# Patient Record
Sex: FEMALE | Race: WHITE
[De-identification: names, ages, dates, MRNs, and addresses within clinical notes are randomized per-mention and may not be internally consistent; named-entity substitution may affect disease eponyms.]

---

## 2017-02-13 ENCOUNTER — HOSPITAL ENCOUNTER (OUTPATIENT)
Dept: HOSPITAL 10 - GIL | Age: 62
Discharge: HOME | End: 2017-02-13
Attending: INTERNAL MEDICINE
Payer: COMMERCIAL

## 2017-02-13 VITALS — DIASTOLIC BLOOD PRESSURE: 89 MMHG | RESPIRATION RATE: 18 BRPM | HEART RATE: 76 BPM | SYSTOLIC BLOOD PRESSURE: 116 MMHG

## 2017-02-13 VITALS
HEIGHT: 61 IN | BODY MASS INDEX: 26.76 KG/M2 | BODY MASS INDEX: 26.76 KG/M2 | WEIGHT: 141.76 LBS | WEIGHT: 141.76 LBS | HEIGHT: 61 IN

## 2017-02-13 VITALS — SYSTOLIC BLOOD PRESSURE: 126 MMHG | RESPIRATION RATE: 18 BRPM | DIASTOLIC BLOOD PRESSURE: 79 MMHG | HEART RATE: 88 BPM

## 2017-02-13 DIAGNOSIS — K57.90: ICD-10-CM

## 2017-02-13 DIAGNOSIS — K20.8: ICD-10-CM

## 2017-02-13 DIAGNOSIS — Z12.11: Primary | ICD-10-CM

## 2017-02-13 DIAGNOSIS — E78.5: ICD-10-CM

## 2017-02-13 DIAGNOSIS — K44.9: ICD-10-CM

## 2017-02-13 DIAGNOSIS — B19.20: ICD-10-CM

## 2017-02-13 PROCEDURE — 43239 EGD BIOPSY SINGLE/MULTIPLE: CPT

## 2017-02-13 PROCEDURE — 45378 DIAGNOSTIC COLONOSCOPY: CPT

## 2017-02-13 PROCEDURE — 88305 TISSUE EXAM BY PATHOLOGIST: CPT

## 2017-02-13 PROCEDURE — 88312 SPECIAL STAINS GROUP 1: CPT

## 2017-02-14 NOTE — GILP
DATE OF PROCEDURE:  02/13/2017

 

 

PREOPERATIVE DIAGNOSIS:  History of hepatitis C screening; colonoscopy attempted.

 

POSTOPERATIVE DIAGNOSIS:  Incomplete colonoscopy due to very poor prep and diverticulosis noted in t
he sigmoid colon.

 

DESCRIPTION OF PROCEDURE:  The patient was put in left lateral decubitus after obtaining informed co
nsent.  Further sedation was done after EGD with Versed 1 mg and fentanyl 25. Rectal exam done. Then
 I advanced an Olympus video pediatric colonoscope to sigmoid colon due to large amount of solid sto
ol.  I could not visualize further and had to back out.  A few diverticula noted. The scope was with
drawn.

 

FINAL IMPRESSION:  Poor prep.  Incomplete colonoscopy.  Recommend barium enema or Cologuard and foll
ow up as outpatient in 6 to 8 weeks.

 

 

Dictated By: BRIAN GRESHAM

DD:    02/13/2017 15:25:19

DT:    02/14/2017 05:23:46

Conf#: [id number]

DID#:  [id number]

CC: Bonifacio Rivera;*EndCC*

## 2017-02-14 NOTE — GILP
DATE OF PROCEDURE:  02/13/2017

 

 

PREOPERATIVE DIAGNOSIS:  Reflux symptoms with hepatitis C.  The patient underwent upper endoscopy an
d biopsy.

 

POSTOPERATIVE DIAGNOSES:  Erosive esophagitis grade II, LA classification B, and no varices.  Hiatus
 hernia about medium size.  Antral gastritis with nodularity.  This area was biopsied.

 

DESCRIPTION OF PROCEDURE:  The patient was put in the left lateral decubitus after obtaining informe
d consent.  Posterior pharynx was anesthetized with 4% Xylocaine. Then she received 3 mg IV Versed, 
75 mcg of fentanyl IV. Then I advanced the Olympus video upper endoscope easily into the esophagus, 
stomach and duodenum. The esophageal area in the distal esophagus showed erosive esophagitis grade I
I, B LA classification hiatus hernia, medium size. In the stomach, there was mild gastritis but in t
he antrum there was nodularity with some erosions and this was photographed and biopsies done.  Duod
enal bulb and first and second part normal.  Then the scope was withdrawn.  Patient had no complicat
ion.

 

Dear Dr. Bonifacio Rivera: There were no varices; however, she does have erosive esophagitis, hiatus 
hernia.  I would recommend she take omeprazole 20 mg every day.  Recommend follow up in 6 weeks.  

 

Also see colonoscopy report.  She was incomplete.  This needs to be further evaluated.  .

 

 

Dictated By: BRIAN GRESHAM

DD:    02/13/2017 15:22:04

DT:    02/14/2017 05:20:50

Conf#: [id number]

DID#:  [id number]

CC: Bonifacio Rivera;*EndCC*

## 2017-05-28 ENCOUNTER — HOSPITAL ENCOUNTER (EMERGENCY)
Dept: HOSPITAL 54 - ER | Age: 62
Discharge: HOME | End: 2017-05-28
Payer: COMMERCIAL

## 2017-05-28 VITALS — BODY MASS INDEX: 22.02 KG/M2 | HEIGHT: 66 IN | WEIGHT: 137 LBS

## 2017-05-28 VITALS — DIASTOLIC BLOOD PRESSURE: 79 MMHG | SYSTOLIC BLOOD PRESSURE: 120 MMHG

## 2017-05-28 DIAGNOSIS — S70.02XA: ICD-10-CM

## 2017-05-28 DIAGNOSIS — B19.20: ICD-10-CM
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
## 2017-05-28 DIAGNOSIS — S40.012A: Primary | ICD-10-CM

## 2017-05-28 PROCEDURE — Z7610: HCPCS

## 2017-05-28 PROCEDURE — 99284 EMERGENCY DEPT VISIT MOD MDM: CPT

## 2017-05-28 PROCEDURE — 73552 X-RAY EXAM OF FEMUR 2/>: CPT

## 2017-05-28 PROCEDURE — A4606 OXYGEN PROBE USED W OXIMETER: HCPCS

## 2017-05-28 PROCEDURE — 72170 X-RAY EXAM OF PELVIS: CPT

## 2017-05-28 PROCEDURE — 73060 X-RAY EXAM OF HUMERUS: CPT

## 2017-05-28 PROCEDURE — 73503 X-RAY EXAM HIP UNI 4/> VIEWS: CPT

## 2017-05-28 NOTE — NUR
PT BIBRA FOR MECHANICAL GROUND LEVEL FALL AT [facility]. PT STATES "I SLIP AND FELL 
ON SOAP/WATER" PT DENIES KO. PT C/O ELENA PAIN AND LEFT UPPER LEG PAIN. PT AOX4 
RR EVEN AND UNLABORED. NO SOB NOTED NAD NOTED. NO NVD AT THIS TIME. PT GOWNED 
AND PLACED ON MONITOR WAITING FOR MD VIGIL.

## 2018-08-29 ENCOUNTER — HOSPITAL ENCOUNTER (EMERGENCY)
Dept: HOSPITAL 54 - ER | Age: 63
Discharge: HOME | End: 2018-08-29
Payer: COMMERCIAL

## 2018-08-29 VITALS — HEIGHT: 66 IN | WEIGHT: 138 LBS | BODY MASS INDEX: 22.18 KG/M2

## 2018-08-29 VITALS — DIASTOLIC BLOOD PRESSURE: 87 MMHG | SYSTOLIC BLOOD PRESSURE: 132 MMHG

## 2018-08-29 DIAGNOSIS — M54.30: ICD-10-CM

## 2018-08-29 DIAGNOSIS — M25.571: ICD-10-CM

## 2018-08-29 DIAGNOSIS — Z86.19: ICD-10-CM

## 2018-08-29 DIAGNOSIS — M25.511: ICD-10-CM

## 2018-08-29 DIAGNOSIS — F41.0: ICD-10-CM

## 2018-08-29 DIAGNOSIS — G89.29: Primary | ICD-10-CM

## 2018-08-29 DIAGNOSIS — M25.551: ICD-10-CM

## 2018-08-29 PROCEDURE — Z7610: HCPCS

## 2018-08-29 PROCEDURE — 96372 THER/PROPH/DIAG INJ SC/IM: CPT

## 2018-08-29 PROCEDURE — A4606 OXYGEN PROBE USED W OXIMETER: HCPCS

## 2018-08-29 PROCEDURE — 73030 X-RAY EXAM OF SHOULDER: CPT

## 2018-08-29 PROCEDURE — 99284 EMERGENCY DEPT VISIT MOD MDM: CPT

## 2018-08-29 PROCEDURE — 93971 EXTREMITY STUDY: CPT

## 2018-08-29 NOTE — NUR
PT TO ER BED 11 C/O GENERALIZED BODY ACHES WORST TO L SHOULDER. PT DENIES ANY 
RECENT TRAUMA. R ANKLE SWELLING NO TRAUMA. AWAITING MD VIGIL.

## 2021-01-22 ENCOUNTER — OFFICE (OUTPATIENT)
Dept: URBAN - METROPOLITAN AREA CLINIC 67 | Facility: CLINIC | Age: 66
End: 2021-01-22

## 2021-01-22 VITALS
SYSTOLIC BLOOD PRESSURE: 138 MMHG | WEIGHT: 146 LBS | DIASTOLIC BLOOD PRESSURE: 92 MMHG | HEIGHT: 66 IN | TEMPERATURE: 97.9 F

## 2021-01-22 DIAGNOSIS — B18.2 HEPATITIS C CHRONIC: ICD-10-CM

## 2021-01-22 PROCEDURE — 99243 OFF/OP CNSLTJ NEW/EST LOW 30: CPT | Performed by: STUDENT IN AN ORGANIZED HEALTH CARE EDUCATION/TRAINING PROGRAM

## 2021-01-22 NOTE — SERVICEHPINOTES
I had the pleasure of seeing the patient in consultation for a history of hepatitis C.  The patient is a pleasant 66yo female who reports having had hepatitis C for many years potentially from a partner.  She denies any unprofessional tattoos or IVDU.  Her only other complaint today is that she has suprapubic discomfort and reports bladder discomfort with urination.  Otherwise she denies overt evidence of bleeding, unintentional weight loss, or changes in bowel function.Outside lab work from November 2020 indicates a normal CMP with a creatinine of 0.76 and a BUN of 23 and an AST is 22 and an ALT of 17 and a bilirubin of 0.5 and albumin of 4.2.  Her CBC is likewise normal with hemoglobin 14.8 and a platelet count of 284.  Her hep C antibody was reactive and her reflex viral load was 2,740,000.  TSH was normal.

## 2021-02-18 ENCOUNTER — OFFICE (OUTPATIENT)
Dept: URBAN - METROPOLITAN AREA CLINIC 67 | Facility: CLINIC | Age: 66
End: 2021-02-18

## 2021-02-18 VITALS
HEIGHT: 66 IN | DIASTOLIC BLOOD PRESSURE: 80 MMHG | WEIGHT: 149 LBS | SYSTOLIC BLOOD PRESSURE: 145 MMHG | TEMPERATURE: 97 F

## 2021-02-18 DIAGNOSIS — R93.2 IMAGING OF BILIARY TRACT ABNORMAL: ICD-10-CM

## 2021-02-18 DIAGNOSIS — B18.2 HEPATITIS C CHRONIC: ICD-10-CM

## 2021-02-18 DIAGNOSIS — Z12.11 SCREENING FOR COLON CANCER: ICD-10-CM

## 2021-02-18 DIAGNOSIS — R35.0 INCREASED FREQUENCY OF URINATION: ICD-10-CM

## 2021-02-18 PROCEDURE — 99213 OFFICE O/P EST LOW 20 MIN: CPT | Performed by: NURSE PRACTITIONER

## 2021-02-18 NOTE — SERVICEHPINOTES
SRIRAM BAH   returns today for follow-up from last visit on   1/22/2021  .    Last seen by Dr Jones w/ following notes: The patient presents for treatment of chronic hepatitis C with a viral load of 2.7 million. The patient has no enzyme elevations and lab work suggests an absence of portal hypertension with a normal bilirubin and a normal platelet count. I recommend completing testing with Hep C genotype as well as an evaluation for hepatitis B or HIV which may alter treatment planning. I will order an abdominal ultrasound for liver texture evaluation as well as to evaluate the patient's bladder per the patient's request (in marie of a fibrosis scan/elastography). I recommended that she contact her primary care doctor for urine testing for possible urinary tract infection or yeast infection. Once the liver lab work is completed and the patient will follow up to discuss tailoring the treatment to her genotype and liver function. The patient reports not having had a screening colonoscopy and so this was advised however the patient wishes to hold off on this as she has too many other issues to deal with first per the patient.Pt returns in f/u after all testing done. BR- HCV genotype 4?, borderline immunity to HBV -will need immunization, BR-HIV negative and AFP wnl. BR-The abd BERONICA shows a benign right hepatic cyst that requires no f/u  no evidence of cirrhosis/mass CBD diffusely dilated but no stone identified - will order MRCP to dieter further. Bladder not seen/evaluated- will order pelvic BERONICA given urinary frequency, reiterated f/u w/ PCP poss urology referral.Results discussed as well as the plan to treat her chronic HCV w/ Mavyret x8 weeks (or 12 weeks-depending on insurance auth). Willing to commit to periodic blood testing to eval response. Abstains from ETOH, no herbs/supplements. BRScreening colonoscopy was once again discussed- willing to proceed.BR

## 2021-03-29 ENCOUNTER — AMBULATORY SURGICAL CENTER (OUTPATIENT)
Dept: URBAN - METROPOLITAN AREA SURGERY 42 | Facility: SURGERY | Age: 66
End: 2021-03-29

## 2021-03-29 VITALS
OXYGEN SATURATION: 96 % | RESPIRATION RATE: 21 BRPM | HEIGHT: 66 IN | TEMPERATURE: 98.6 F | WEIGHT: 140 LBS | HEART RATE: 81 BPM | SYSTOLIC BLOOD PRESSURE: 133 MMHG | DIASTOLIC BLOOD PRESSURE: 79 MMHG

## 2021-03-29 DIAGNOSIS — K57.30 DVRTCLOS OF LG INT W/O PERFORATION OR ABSCESS W/O BLEEDING: ICD-10-CM

## 2021-03-29 DIAGNOSIS — K63.5 POLYP OF COLON: ICD-10-CM

## 2021-03-29 LAB — SURGICAL: PDF REPORT: (no result)

## 2021-03-29 PROCEDURE — 45380 COLONOSCOPY AND BIOPSY: CPT | Performed by: STUDENT IN AN ORGANIZED HEALTH CARE EDUCATION/TRAINING PROGRAM

## 2021-03-29 RX ORDER — HYDROCORTISONE 25 MG/G
CREAM TOPICAL
Qty: 30 | Status: COMPLETED
Start: 2021-03-10 | End: 2021-04-23

## 2021-04-23 ENCOUNTER — OFFICE (OUTPATIENT)
Dept: URBAN - METROPOLITAN AREA CLINIC 67 | Facility: CLINIC | Age: 66
End: 2021-04-23

## 2021-04-23 VITALS
TEMPERATURE: 98.4 F | DIASTOLIC BLOOD PRESSURE: 60 MMHG | SYSTOLIC BLOOD PRESSURE: 148 MMHG | WEIGHT: 146 LBS | HEIGHT: 66 IN

## 2021-04-23 DIAGNOSIS — B18.2 HEPATITIS C CHRONIC: ICD-10-CM

## 2021-04-23 DIAGNOSIS — K64.8 HEMORRHOIDS, INTERNAL: ICD-10-CM

## 2021-04-23 DIAGNOSIS — R93.2 IMAGING OF BILIARY TRACT ABNORMAL: ICD-10-CM

## 2021-04-23 DIAGNOSIS — K57.30 DVRTCLOS OF LG INT W/O PERFORATION OR ABSCESS W/O BLEEDING: ICD-10-CM

## 2021-04-23 PROBLEM — K63.5 POLYP OF COLON: Status: ACTIVE | Noted: 2021-03-29

## 2021-04-23 PROCEDURE — 99213 OFFICE O/P EST LOW 20 MIN: CPT | Performed by: NURSE PRACTITIONER

## 2021-04-23 NOTE — SERVICEHPINOTES
SRIRAM BAH   returns today for follow-up from last visit on 2/18/21. Pt w/ chronic hepatitis C with a viral load of 2.7 million. The patient has no enzyme elevations and lab work suggests an absence of portal hypertension with a normal bilirubin and a normal platelet count.   When last seen:BR-HCV genotype 4, borderline immunity to HBV -will need immunization w/ PCP not done yet BR-HIV negative and AFP wnl. BR-The abd BERONICA shows a benign right hepatic cyst that requires no f/u no evidence of cirrhosis/mass CBD diffusely dilated but no stone identified - MRCP ordered to eval further. Pt did not follow through.BR-She underwent a colonoscopy 3/29/21 w/ mod diverticulosis and a HP colon polyp and large IH not amenable to banding, surgical referral recommended pt unable to schedule yet. Has Anusol to be used bid x14 days prn.Was noncompliant w/ recommended Hep C viral load due 4/2/21 as she  continued on Mavyret that was started on 3/6/21. Reports no side effects and states she is compliant w/ daily dosage.

## 2021-07-17 ENCOUNTER — HOSPITAL ENCOUNTER (INPATIENT)
Dept: HOSPITAL 54 - ER | Age: 66
LOS: 2 days | Discharge: HOME | DRG: 812 | End: 2021-07-19
Attending: INTERNAL MEDICINE | Admitting: NURSE PRACTITIONER
Payer: COMMERCIAL

## 2021-07-17 VITALS — DIASTOLIC BLOOD PRESSURE: 65 MMHG | SYSTOLIC BLOOD PRESSURE: 108 MMHG

## 2021-07-17 VITALS — HEIGHT: 64 IN | BODY MASS INDEX: 25.44 KG/M2 | WEIGHT: 149.01 LBS

## 2021-07-17 DIAGNOSIS — T43.621A: Primary | ICD-10-CM

## 2021-07-17 DIAGNOSIS — Z20.822: ICD-10-CM

## 2021-07-17 DIAGNOSIS — F41.9: ICD-10-CM

## 2021-07-17 DIAGNOSIS — E88.09: ICD-10-CM

## 2021-07-17 DIAGNOSIS — R73.9: ICD-10-CM

## 2021-07-17 DIAGNOSIS — E86.0: ICD-10-CM

## 2021-07-17 DIAGNOSIS — F15.188: ICD-10-CM

## 2021-07-17 DIAGNOSIS — R40.2252: ICD-10-CM

## 2021-07-17 DIAGNOSIS — R40.2142: ICD-10-CM

## 2021-07-17 DIAGNOSIS — Z86.19: ICD-10-CM
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
## 2021-07-17 DIAGNOSIS — E83.51: ICD-10-CM

## 2021-07-17 DIAGNOSIS — R40.2362: ICD-10-CM

## 2021-07-17 DIAGNOSIS — G92: ICD-10-CM

## 2021-07-17 DIAGNOSIS — J96.01: ICD-10-CM

## 2021-07-17 LAB
ALBUMIN SERPL BCP-MCNC: 3.2 G/DL (ref 3.4–5)
ALP SERPL-CCNC: 88 U/L (ref 46–116)
ALT SERPL W P-5'-P-CCNC: 19 U/L (ref 12–78)
AMMONIA PLAS-SCNC: 22 UMOL/L (ref 11–32)
APAP SERPL-MCNC: < 2 UG/ML (ref 10–30)
AST SERPL W P-5'-P-CCNC: 18 U/L (ref 15–37)
BASOPHILS # BLD AUTO: 0.1 K/UL (ref 0–0.2)
BASOPHILS NFR BLD AUTO: 0.7 % (ref 0–2)
BILIRUB DIRECT SERPL-MCNC: 0.1 MG/DL (ref 0–0.2)
BILIRUB SERPL-MCNC: 0.5 MG/DL (ref 0.2–1)
BUN SERPL-MCNC: 17 MG/DL (ref 7–18)
CALCIUM SERPL-MCNC: 8.4 MG/DL (ref 8.5–10.1)
CHLORIDE SERPL-SCNC: 105 MMOL/L (ref 98–107)
CO2 SERPL-SCNC: 23 MMOL/L (ref 21–32)
COLOR UR: YELLOW
CREAT SERPL-MCNC: 1 MG/DL (ref 0.6–1.3)
EOSINOPHIL NFR BLD AUTO: 3.4 % (ref 0–6)
ETHANOL SERPL-MCNC: < 3 MG/DL (ref 0–0)
GLUCOSE SERPL-MCNC: 287 MG/DL (ref 74–106)
GLUCOSE UR STRIP-MCNC: (no result) MG/DL
HCT VFR BLD AUTO: 44 % (ref 33–45)
HGB BLD-MCNC: 14.6 G/DL (ref 11.5–14.8)
LYMPHOCYTES NFR BLD AUTO: 3.5 K/UL (ref 0.8–4.8)
LYMPHOCYTES NFR BLD AUTO: 46.8 % (ref 20–44)
MCHC RBC AUTO-ENTMCNC: 33 G/DL (ref 31–36)
MCV RBC AUTO: 93 FL (ref 82–100)
MONOCYTES NFR BLD AUTO: 0.6 K/UL (ref 0.1–1.3)
MONOCYTES NFR BLD AUTO: 8 % (ref 2–12)
NEUTROPHILS # BLD AUTO: 3.1 K/UL (ref 1.8–8.9)
NEUTROPHILS NFR BLD AUTO: 41.1 % (ref 43–81)
PH UR STRIP: 7 [PH] (ref 5–8)
PLATELET # BLD AUTO: 308 K/UL (ref 150–450)
POTASSIUM SERPL-SCNC: 3.5 MMOL/L (ref 3.5–5.1)
PROT SERPL-MCNC: 7.2 G/DL (ref 6.4–8.2)
PROT UR QL STRIP: >=300 MG/DL
RBC # BLD AUTO: 4.76 MIL/UL (ref 4–5.2)
RBC #/AREA URNS HPF: (no result) /HPF (ref 0–2)
SODIUM SERPL-SCNC: 139 MMOL/L (ref 136–145)
UROBILINOGEN UR STRIP-MCNC: 0.2 EU/DL
WBC #/AREA URNS HPF: (no result) /HPF
WBC #/AREA URNS HPF: (no result) /HPF (ref 0–3)
WBC NRBC COR # BLD AUTO: 7.6 K/UL (ref 4.3–11)

## 2021-07-17 PROCEDURE — A6253 ABSORPT DRG > 48 SQ IN W/O B: HCPCS

## 2021-07-17 PROCEDURE — G0378 HOSPITAL OBSERVATION PER HR: HCPCS

## 2021-07-17 PROCEDURE — G0480 DRUG TEST DEF 1-7 CLASSES: HCPCS

## 2021-07-17 PROCEDURE — C9803 HOPD COVID-19 SPEC COLLECT: HCPCS

## 2021-07-17 RX ADMIN — ENOXAPARIN SODIUM SCH MG: 40 INJECTION SUBCUTANEOUS at 15:30

## 2021-07-17 RX ADMIN — PANTOPRAZOLE SODIUM SCH MG: 40 TABLET, DELAYED RELEASE ORAL at 13:30

## 2021-07-17 RX ADMIN — SODIUM CHLORIDE PRN MLS/HR: 9 INJECTION, SOLUTION INTRAVENOUS at 23:24

## 2021-07-17 NOTE — NUR
RECEIVED REPORT FROM [facility] DAY [facility] 

-------------------------------------------------------------------------------

Addendum: 07/18/21 at 0001 by ADILENE NICHOLS RN

-------------------------------------------------------------------------------

RECEIVED REPORT FROM [facility] 3W DAY SHIFT

## 2021-07-17 NOTE — NUR
PT TRANSPORTED VIA STRETCHER TO UNIT AT THIS TIME. RECEIVED REPORT FROM [facility] @3W. PT 
ADMITTED TO TELE. A/O X4 PT ON CARDIAC MONITOR READING SR @62, ROOM AIR, NO SOB OR APPARENT 
DISTRESS AT THE MOMENT. RESPIRATIONS EVEN AND UNLABORED,DENIES ANY PAIN OR DISCOMFORT. 
ACTIVE BOWEL SOUNDS AUSCULTATED THROUGHOUT. SKIN IS INTACT, WARM TO TOUCH,NON PITTING EDEMA 
NOTED ON R ANKLE. CAPILLARY REFILL <3 SECONDS, PULSES PRESENT BILATERALLY, GOOD CIRCULATION 
NOTED. IV ACCESS ON RAC G#18 AND RFA G#20, INTACT, PATENT AND FLUSHING WELL. ALL BELONGINGS 
ACCOUNTED FOR AND AT BEDSIDE. SAFETY PRECAUTIONS IN PLACE AND MAINTAINED AT ALL TIMES. BED 
IN LOWEST LOCKED POSITION, HOB ELEVATED, SIDE RAILS UPX2. CALL LIGHT AND TABLE WITHIN REACH, 
BED ALARM ON. WILL CONTINUE TO MONITOR

## 2021-07-17 NOTE — NUR
PT SETH FROM HOME C/O FENTANYL OD "WE FOUND HER IN THE BATH TUB NOT BREATHING" 
NARCAN 4MG IVP GIVEN BY EMS. PT IS AAOX3, NOT IN RESPIRATORY DISTRES, HOOKED TO 
O2 VIA NC AT 2 LPM AND CARDIAC MONITOR, KEPT RESTED AND COMFORTABLE. WILL 
CONTINUE TO MONITOR.

## 2021-07-18 VITALS — DIASTOLIC BLOOD PRESSURE: 69 MMHG | SYSTOLIC BLOOD PRESSURE: 115 MMHG

## 2021-07-18 VITALS — SYSTOLIC BLOOD PRESSURE: 102 MMHG | DIASTOLIC BLOOD PRESSURE: 54 MMHG

## 2021-07-18 VITALS — DIASTOLIC BLOOD PRESSURE: 82 MMHG | SYSTOLIC BLOOD PRESSURE: 122 MMHG

## 2021-07-18 VITALS — SYSTOLIC BLOOD PRESSURE: 125 MMHG | DIASTOLIC BLOOD PRESSURE: 80 MMHG

## 2021-07-18 VITALS — SYSTOLIC BLOOD PRESSURE: 121 MMHG | DIASTOLIC BLOOD PRESSURE: 72 MMHG

## 2021-07-18 LAB
BASOPHILS # BLD AUTO: 0 K/UL (ref 0–0.2)
BASOPHILS NFR BLD AUTO: 0.1 % (ref 0–2)
BUN SERPL-MCNC: 16 MG/DL (ref 7–18)
CALCIUM SERPL-MCNC: 8.5 MG/DL (ref 8.5–10.1)
CHLORIDE SERPL-SCNC: 102 MMOL/L (ref 98–107)
CHOLEST SERPL-MCNC: 176 MG/DL (ref ?–200)
CO2 SERPL-SCNC: 27 MMOL/L (ref 21–32)
CREAT SERPL-MCNC: 0.8 MG/DL (ref 0.6–1.3)
EOSINOPHIL NFR BLD AUTO: 0.5 % (ref 0–6)
GLUCOSE SERPL-MCNC: 101 MG/DL (ref 74–106)
HCT VFR BLD AUTO: 41 % (ref 33–45)
HDLC SERPL-MCNC: 70 MG/DL (ref 40–60)
HGB BLD-MCNC: 13.5 G/DL (ref 11.5–14.8)
LDLC SERPL DIRECT ASSAY-MCNC: 101 MG/DL (ref 0–99)
LYMPHOCYTES NFR BLD AUTO: 1.6 K/UL (ref 0.8–4.8)
LYMPHOCYTES NFR BLD AUTO: 19.2 % (ref 20–44)
MAGNESIUM SERPL-MCNC: 1.8 MG/DL (ref 1.8–2.4)
MCHC RBC AUTO-ENTMCNC: 33 G/DL (ref 31–36)
MCV RBC AUTO: 93 FL (ref 82–100)
MONOCYTES NFR BLD AUTO: 0.6 K/UL (ref 0.1–1.3)
MONOCYTES NFR BLD AUTO: 6.8 % (ref 2–12)
NEUTROPHILS # BLD AUTO: 6.2 K/UL (ref 1.8–8.9)
NEUTROPHILS NFR BLD AUTO: 73.4 % (ref 43–81)
PHOSPHATE SERPL-MCNC: 3.5 MG/DL (ref 2.5–4.9)
PLATELET # BLD AUTO: 257 K/UL (ref 150–450)
POTASSIUM SERPL-SCNC: 3.6 MMOL/L (ref 3.5–5.1)
RBC # BLD AUTO: 4.43 MIL/UL (ref 4–5.2)
SODIUM SERPL-SCNC: 138 MMOL/L (ref 136–145)
TRIGL SERPL-MCNC: 53 MG/DL (ref 30–150)
TSH SERPL DL<=0.005 MIU/L-ACNC: 1.37 UIU/ML (ref 0.36–3.74)
WBC NRBC COR # BLD AUTO: 8.5 K/UL (ref 4.3–11)

## 2021-07-18 RX ADMIN — ENOXAPARIN SODIUM SCH MG: 40 INJECTION SUBCUTANEOUS at 20:39

## 2021-07-18 RX ADMIN — PANTOPRAZOLE SODIUM SCH MG: 40 TABLET, DELAYED RELEASE ORAL at 08:38

## 2021-07-18 RX ADMIN — SODIUM CHLORIDE PRN MLS/HR: 9 INJECTION, SOLUTION INTRAVENOUS at 10:14

## 2021-07-18 NOTE — NUR
TELE/RN OPENING NOTES

RECEIVED PATIENT ON BED SLEEPING EASILY AWAKEN BY NAME AND LIGHT TOUCH. ALERT AND ORIENTED 
X3. PATIENT IN ROOM AIR SATURATING WELL. PATIENT IN NO APPARENT RESPIRATORY DISTRESS NOTED. 
NO COMPLAINED OF PAIN NOTED AT THIS TIME. TELE MONITOR READING SINUS RHYTHM 60 BP. WILL 
CONTINUE TO MONITOR.

## 2021-07-18 NOTE — NUR
TELE/RN NOTES

PATIENT COMPLAINED OF NAUSEA AND HEADACHE ZOFRAN 4 MG IV AND TYLENOL 650MG 1 TAB P.O. WAS 
GIVEN WILL CONTINUE TO MONITOR.

## 2021-07-18 NOTE — NUR
TELE/RN OPENING NOTES

PATIENT IS ON BED ALERT AND ORIENTED X 3 EPISODE OF CONFUSION. PATIENT IN ROOM AIR SAO2 97% 
SATURATING WELL. PATIENT IN NO APPARENT RESPIRATORY DISTRESS NOTED. NO COMPLAINED OF PAIN 
NOTED AT THIS TIME. SEEN AND EXAMINED BY MD WITH ORDERS MADE AND CARRIED OUT. ALL DUE 
MEDICATIONS WAS GIVEN. IV ACCESS AT RIGHT AC # 18g PATENT AND INTACT AND RIGHT FOREARM # 20G 
WITH IV FLUID OF NS 1L AT 125ML/HR ON AND INFUSING WELL. SAFETY PRECAUTIONS WAS IN PLACED. 
BED IN LOWEST POSITION AND LOCKED. SIDE RAILS UP X2. WILL  ENDORSED TO NIGHT SHIFT FOR LEROY.

-------------------------------------------------------------------------------

Addendum: 07/18/21 at 1903 by LUPIS HAGAN RN

-------------------------------------------------------------------------------

ERROR

## 2021-07-18 NOTE — NUR
RN TELE CLOSING NOTE

PT IS AWAKE IN BED, STABLE ON RA, NO SOB NOTED. NO S/S OF RESPIRATORY DISTRESS. PT IS 
AMBULATORY WITH BRP. PT IS ON EXTERNAL CARDIAC MONITOR READING SB @59. IV ACCESS IS INTACT, 
PATENT, AND FLUSHING WELL. ALL NEEDS HAVE BEEN MET. ALL CARE, NEEDS, MEDICATIONS, AND 
TREATMENT ADMINISTERED  AS ANTICIPATED PER ORDER. PT ENCOURAGED TO REPOSITION Q2H AND PRN. 
SAFETY, SEIZURE, AND ASPIRATION PRECAUTIONS MAINTAINED AT ALL TIMES. BED IN LOWEST LOCKED 
POSITION, HOB ELEVATED, SIDE RAILS UPX2. CALL LIGHT AND TABLE WITHIN REACH. WILL ENDORSE TO 
ONCOMING NURSE FOR LEROY.

## 2021-07-18 NOTE — NUR
SPOKE WITH DR RNEÉ BUTCHER AND HE GAVE THE ORDER FOR PT TO BE ON REGULAR DIET. ORDERS READ 
BACK ENTERED AND CARRIED OUT

## 2021-07-18 NOTE — NUR
TELE/RN CLOSING NOTES

PATIENT IS ON BED ALERT AND ORIENTED X 3 EPISODE OF CONFUSION. PATIENT IN ROOM AIR SAO2 97% 
SATURATING WELL. PATIENT IN NO APPARENT RESPIRATORY DISTRESS NOTED. NO COMPLAINED OF PAIN 
NOTED AT THIS TIME. SEEN AND EXAMINED BY MD WITH ORDERS MADE AND CARRIED OUT. ALL DUE 
MEDICATIONS WAS GIVEN. IV ACCESS AT RIGHT AC # 18g PATENT AND INTACT AND RIGHT FOREARM # 20G 
WITH IV FLUID OF NS 1L AT 125ML/HR ON AND INFUSING WELL. SAFETY PRECAUTIONS WAS IN PLACED. 
BED IN LOWEST POSITION AND LOCKED. SIDE RAILS UP X2. WILL  ENDORSED TO NIGHT SHIFT FOR LEROY.

## 2021-07-18 NOTE — NUR
TELE RN OPENING NOTES



RECEIVED PATIENT IN  BED, ASLEEP, EASILY AWAKEN BY VERBAL AND TACTILE STIMULI. PATIENT IN 
ROOM AIR SATURATING WELL AT 95%. PATIENT IN NO APPARENT RESPIRATORY DISTRESS NOTED. NO 
COMPLAINTS OF PAIN AT THIS TIME. ON TELE MONITOR SHOWING SINUS RHYTHM HR AT 66. IV ACCESS ON 
RIGHT AC G#18, PATENT AND INTACT AND RIGHT FOREARM # 20G WITH ONGOING IV FLUID OF NS 1L AT 
125ML/HR INFUSING WELL, NO REDNESS, NO S/SX OF INFILTRATION NOTED. SAFETY PRECAUTIONS WAS 
OBSERVED: BED IN LOWEST LOCKED POSITION, SIDE RAILS UP X2, CALL LIGHT WITHIN EASY REACH. 
WILL  CONTINUE TO MONITOR PATIENT'S CURRENT STATUS.

## 2021-07-19 VITALS — SYSTOLIC BLOOD PRESSURE: 143 MMHG | DIASTOLIC BLOOD PRESSURE: 65 MMHG

## 2021-07-19 VITALS — DIASTOLIC BLOOD PRESSURE: 86 MMHG | SYSTOLIC BLOOD PRESSURE: 129 MMHG

## 2021-07-19 VITALS — SYSTOLIC BLOOD PRESSURE: 113 MMHG | DIASTOLIC BLOOD PRESSURE: 67 MMHG

## 2021-07-19 VITALS — SYSTOLIC BLOOD PRESSURE: 128 MMHG | DIASTOLIC BLOOD PRESSURE: 88 MMHG

## 2021-07-19 LAB
BASOPHILS # BLD AUTO: 0 K/UL (ref 0–0.2)
BASOPHILS NFR BLD AUTO: 0.1 % (ref 0–2)
BUN SERPL-MCNC: 16 MG/DL (ref 7–18)
CALCIUM SERPL-MCNC: 8.1 MG/DL (ref 8.5–10.1)
CHLORIDE SERPL-SCNC: 105 MMOL/L (ref 98–107)
CO2 SERPL-SCNC: 27 MMOL/L (ref 21–32)
CREAT SERPL-MCNC: 0.7 MG/DL (ref 0.6–1.3)
EOSINOPHIL NFR BLD AUTO: 0.9 % (ref 0–6)
GLUCOSE SERPL-MCNC: 109 MG/DL (ref 74–106)
HCT VFR BLD AUTO: 39 % (ref 33–45)
HGB BLD-MCNC: 12.9 G/DL (ref 11.5–14.8)
LYMPHOCYTES NFR BLD AUTO: 19.5 % (ref 20–44)
LYMPHOCYTES NFR BLD AUTO: 2 K/UL (ref 0.8–4.8)
MAGNESIUM SERPL-MCNC: 1.6 MG/DL (ref 1.8–2.4)
MCHC RBC AUTO-ENTMCNC: 33 G/DL (ref 31–36)
MCV RBC AUTO: 92 FL (ref 82–100)
MONOCYTES NFR BLD AUTO: 0.6 K/UL (ref 0.1–1.3)
MONOCYTES NFR BLD AUTO: 5.9 % (ref 2–12)
NEUTROPHILS # BLD AUTO: 7.3 K/UL (ref 1.8–8.9)
NEUTROPHILS NFR BLD AUTO: 73.6 % (ref 43–81)
PHOSPHATE SERPL-MCNC: 2.4 MG/DL (ref 2.5–4.9)
PLATELET # BLD AUTO: 254 K/UL (ref 150–450)
POTASSIUM SERPL-SCNC: 4 MMOL/L (ref 3.5–5.1)
RBC # BLD AUTO: 4.2 MIL/UL (ref 4–5.2)
SODIUM SERPL-SCNC: 139 MMOL/L (ref 136–145)
WBC NRBC COR # BLD AUTO: 10 K/UL (ref 4.3–11)

## 2021-07-19 RX ADMIN — SODIUM CHLORIDE PRN MLS/HR: 9 INJECTION, SOLUTION INTRAVENOUS at 03:08

## 2021-07-19 RX ADMIN — PANTOPRAZOLE SODIUM SCH MG: 40 TABLET, DELAYED RELEASE ORAL at 08:12

## 2021-07-19 RX ADMIN — MAGNESIUM SULFATE IN DEXTROSE SCH MLS/HR: 10 INJECTION, SOLUTION INTRAVENOUS at 09:17

## 2021-07-19 RX ADMIN — MAGNESIUM SULFATE IN DEXTROSE SCH MLS/HR: 10 INJECTION, SOLUTION INTRAVENOUS at 10:39

## 2021-07-19 NOTE — NUR
TELE RN OPENING NOTES



RECEIVED PATIENT IN BED, ASLEEP. PATIENT ON ROOM AIR; BREATHING EVEN AND UNLABORED, NO SOB 
PRESENT AT THIS TIME. TELE MONITOR WITH A CURRENT READING OF SR 77 BPM. NO S/S OF PAIN SUCH 
AS FACIAL GRIMACING, MOANING OR GUARDING NOTED. RAC G #18 AND RFA g #20 IV ACCESS PRESENT 
AND INTACT INFUSING NS @125 MLS/HR. SAFETY PRECAUTIONS IN PLACE; BED IN LOW POSITION AND 
LOCKED, RAILS UPX2, CALL LIGHT WITHIN REACH. WILL CONTINUE TO MONITOR PATIENT.

## 2021-07-19 NOTE — NUR
TELE RN CLOSING NOTES



PATIENT IN  BED, ASLEEP, EASILY AWAKEN BY VERBAL AND TACTILE STIMULI. PATIENT IN ROOM AIR 
TOLERATING WELL. PATIENT IN NO APPARENT DISTRESS NOTED. NO COMPLAINTS OF PAIN AT THIS TIME. 
ON TELE MONITOR SHOWING SINUS RHYTHM HR AT 90. IV ACCESS ON RIGHT AC G#18, PATENT AND INTACT 
AND RIGHT FOREARM # 20G WITH ONGOING IV FLUID OF NS 1L AT 125ML/HR INFUSING WELL, NO 
REDNESS, NO S/SX OF INFILTRATION NOTED. SAFETY PRECAUTIONS WAS OBSERVED AND MAINTAINED 
DURING THE SHIFT: BED IN LOWEST LOCKED POSITION, SIDE RAILS UP X2, CALL LIGHT WITHIN EASY 
REACH. ALL NEEDS ATTENDED AND MET. WILL ENDORSE TO MORNING NURSE FOR LEROY.

## 2021-07-19 NOTE — NUR
TELE RN DISCHARGE NOTES



PATIENT DISCHARGED HOME IN MEDICALLY STABLE CONDITION. PATIENT AWAKE, A/O X4, ABLE TO MAKE 
NEEDS KNOWN. ALL DISCHARGE DOCUMENTATION READY; TEACHING PROVIDED REGARDING PHYSICIAN 
DISCHARGE ORDERS; PATIENT VERBALIZED UNDERSTANDING. BELONGINGS ACCOUNTED FOR AND FORM SIGNED 
AS WELL. SKIN INTACT. BEFORE LEAVING THE UNIT IV ACCESS WAS REMOVED WITH THE WRISTBAND. 
PATIENT WAS PICKED UP BY HER FRIEND. PATIENT LEFT THE UNIT ACCOMPANIED BY CNA AND RN VIA 
WHEELCHAIR A [id number]. LEFT THE HOSPITAL IN A PRIVATE CAR.

## 2021-10-01 ENCOUNTER — OFFICE (OUTPATIENT)
Dept: URBAN - METROPOLITAN AREA CLINIC 67 | Facility: CLINIC | Age: 66
End: 2021-10-01

## 2021-10-01 VITALS
HEIGHT: 66 IN | WEIGHT: 146 LBS | DIASTOLIC BLOOD PRESSURE: 60 MMHG | SYSTOLIC BLOOD PRESSURE: 158 MMHG | TEMPERATURE: 98.5 F

## 2021-10-01 DIAGNOSIS — K30 DYSPEPSIA: ICD-10-CM

## 2021-10-01 DIAGNOSIS — R10.13 EPIGASTRIC PAIN: ICD-10-CM

## 2021-10-01 DIAGNOSIS — B18.2 HEPATITIS C CHRONIC: ICD-10-CM

## 2021-10-01 DIAGNOSIS — K57.30 DVRTCLOS OF LG INT W/O PERFORATION OR ABSCESS W/O BLEEDING: ICD-10-CM

## 2021-10-01 PROCEDURE — 99214 OFFICE O/P EST MOD 30 MIN: CPT | Performed by: STUDENT IN AN ORGANIZED HEALTH CARE EDUCATION/TRAINING PROGRAM

## 2021-10-01 RX ORDER — PANTOPRAZOLE SODIUM 40 MG/1
TABLET, DELAYED RELEASE ORAL
Qty: 60 | Status: COMPLETED
End: 2024-01-03

## 2021-10-01 RX ORDER — SUCRALFATE 1 G/1
TABLET ORAL
Qty: 120 | Status: COMPLETED
Start: 2021-10-01 | End: 2024-01-03

## 2021-10-01 NOTE — SERVICEHPINOTES
I had the pleasure of seeing the patient in follow-up for heartburn and dyspepsia.  The patient is a pleasant 66-year-old female who we recently treated for hepatitis C genotype 4.  The patient had achieved a sustained virologic remission.  The patient has been having various chronic abdominal discomforts and complaints and so had an upper endoscopy, EUS, and colonoscopy with findings of a hiatal hernia, and diverticulosis.  The patient continues to describe epigastric discomfort and episodes of heartburn.

## 2024-01-03 ENCOUNTER — OFFICE (OUTPATIENT)
Dept: URBAN - METROPOLITAN AREA CLINIC 67 | Facility: CLINIC | Age: 69
End: 2024-01-03

## 2024-01-03 VITALS — DIASTOLIC BLOOD PRESSURE: 86 MMHG | SYSTOLIC BLOOD PRESSURE: 152 MMHG | HEIGHT: 66 IN | WEIGHT: 159 LBS

## 2024-01-03 DIAGNOSIS — K30 DYSPEPSIA: ICD-10-CM

## 2024-01-03 DIAGNOSIS — K59.00 CONSTIPATION: ICD-10-CM

## 2024-01-03 DIAGNOSIS — R10.30 ABDOMINAL PAIN, OTHER OR MULTIPLE SITES: ICD-10-CM

## 2024-01-03 DIAGNOSIS — R93.2 IMAGING OF BILIARY TRACT ABNORMAL: ICD-10-CM

## 2024-01-03 PROCEDURE — 99214 OFFICE O/P EST MOD 30 MIN: CPT | Performed by: STUDENT IN AN ORGANIZED HEALTH CARE EDUCATION/TRAINING PROGRAM

## 2024-01-03 NOTE — SERVICEHPINOTES
I had the pleasure of seeing the patient in follow-up for bloating and abdominal discomfort.  The patient is a pleasant 68-year-old female who in March 2021 was treated for hepatitis C genotype 4.  The patient had achieved a sustained virologic remission.  The patient had been having various chronic abdominal discomforts and complaints and so in 2021 had an MRI, upper endoscopy, EUS, and colonoscopy with findings of a hiatal hernia, and diverticulosis and benign dilation of the biliary ducts.  The patient continues to describe non-specific symptoms of general unwellness in the abdomen.  She reports she read that her Hep C treatment could cause liver tumors.

## 2025-02-23 ENCOUNTER — HOSPITAL ENCOUNTER (EMERGENCY)
Dept: HOSPITAL 54 - ER | Age: 70
Discharge: HOME | End: 2025-02-23
Payer: MEDICARE

## 2025-02-23 VITALS — DIASTOLIC BLOOD PRESSURE: 66 MMHG | SYSTOLIC BLOOD PRESSURE: 121 MMHG | TEMPERATURE: 98 F | OXYGEN SATURATION: 97 %

## 2025-02-23 VITALS — HEIGHT: 66 IN | WEIGHT: 129 LBS | BODY MASS INDEX: 20.73 KG/M2

## 2025-02-23 DIAGNOSIS — M25.562: Primary | ICD-10-CM

## 2025-02-23 DIAGNOSIS — M11.262: ICD-10-CM

## 2025-02-23 DIAGNOSIS — M25.462: ICD-10-CM

## 2025-02-23 RX ADMIN — Medication ONE TAB: at 19:55

## 2025-04-23 ENCOUNTER — HOSPITAL ENCOUNTER (EMERGENCY)
Dept: HOSPITAL 54 - ER | Age: 70
LOS: 1 days | Discharge: HOME | End: 2025-04-24
Payer: COMMERCIAL

## 2025-04-23 VITALS — WEIGHT: 134 LBS | HEIGHT: 66 IN | BODY MASS INDEX: 21.53 KG/M2

## 2025-04-23 DIAGNOSIS — F19.10: ICD-10-CM

## 2025-04-23 DIAGNOSIS — Z79.899: ICD-10-CM

## 2025-04-23 DIAGNOSIS — Z86.19: ICD-10-CM

## 2025-04-23 DIAGNOSIS — Z87.19: ICD-10-CM

## 2025-04-23 DIAGNOSIS — M54.9: ICD-10-CM

## 2025-04-23 DIAGNOSIS — G89.29: Primary | ICD-10-CM

## 2025-04-23 DIAGNOSIS — Z86.69: ICD-10-CM

## 2025-04-23 DIAGNOSIS — R10.31: ICD-10-CM

## 2025-04-23 LAB
ALBUMIN SERPL BCP-MCNC: 3.6 G/DL (ref 3.4–5)
APPEARANCE UR: CLEAR
BACTERIA UR CULT: YES
BASOPHILS NFR BLD AUTO: 0.4 % (ref 0–2)
BILIRUB SERPL-MCNC: 0.5 MG/DL (ref 0.2–1)
BILIRUB UR QL STRIP: NEGATIVE
CALCIUM SERPL-MCNC: 9.8 MG/DL (ref 8.5–10.1)
COLOR UR: YELLOW
CREAT SERPL-MCNC: 0.9 MG/DL (ref 0.6–1.3)
EOSINOPHIL # BLD AUTO: 0.2 K/UL (ref 0–0.7)
EOSINOPHIL NFR BLD AUTO: 2.2 % (ref 0–6)
ERYTHROCYTE [DISTWIDTH] IN BLOOD BY AUTOMATED COUNT: 13.3 % (ref 11.5–15)
GLUCOSE UR STRIP-MCNC: NEGATIVE MG/DL
HCT VFR BLD AUTO: 44 % (ref 33–45)
HGB UR QL STRIP: (no result) ERY/UL
KETONES UR STRIP-MCNC: NEGATIVE MG/DL
LEUKOCYTE ESTERASE UR QL STRIP: (no result)
LYMPHOCYTES NFR BLD AUTO: 2.3 K/UL (ref 0.8–4.8)
MCH RBC QN AUTO: 30 PG (ref 26–33)
MCHC RBC AUTO-ENTMCNC: 34 G/DL (ref 31–36)
MCV RBC AUTO: 90 FL (ref 82–100)
MONOCYTES NFR BLD AUTO: 0.8 K/UL (ref 0.1–1.3)
MONOCYTES NFR BLD AUTO: 9.4 % (ref 2–12)
NEUTROPHILS # BLD AUTO: 4.8 K/UL (ref 1.8–8.9)
NITRITE UR QL STRIP: NEGATIVE
PLATELET # BLD AUTO: 302 K/UL (ref 150–450)
POTASSIUM SERPL-SCNC: 3.6 MMOL/L (ref 3.5–5.1)
PROT SERPL-MCNC: 8.3 G/DL (ref 6.4–8.2)
PROT UR QL STRIP: NEGATIVE MG/DL
RBC # BLD AUTO: 4.94 MIL/UL (ref 4–5.2)
UROBILINOGEN UR STRIP-MCNC: 0.2 EU/DL
WBC NRBC COR # BLD AUTO: 8.1 K/UL (ref 4.3–11)

## 2025-04-23 PROCEDURE — 87186 SC STD MICRODIL/AGAR DIL: CPT

## 2025-04-23 PROCEDURE — 93005 ELECTROCARDIOGRAM TRACING: CPT

## 2025-04-23 PROCEDURE — 84484 ASSAY OF TROPONIN QUANT: CPT

## 2025-04-23 PROCEDURE — 83880 ASSAY OF NATRIURETIC PEPTIDE: CPT

## 2025-04-23 PROCEDURE — 99285 EMERGENCY DEPT VISIT HI MDM: CPT

## 2025-04-23 PROCEDURE — 80053 COMPREHEN METABOLIC PANEL: CPT

## 2025-04-23 PROCEDURE — 81001 URINALYSIS AUTO W/SCOPE: CPT

## 2025-04-23 PROCEDURE — 71045 X-RAY EXAM CHEST 1 VIEW: CPT

## 2025-04-23 PROCEDURE — 87040 BLOOD CULTURE FOR BACTERIA: CPT

## 2025-04-23 PROCEDURE — 96372 THER/PROPH/DIAG INJ SC/IM: CPT

## 2025-04-23 PROCEDURE — 36415 COLL VENOUS BLD VENIPUNCTURE: CPT

## 2025-04-23 PROCEDURE — 74176 CT ABD & PELVIS W/O CONTRAST: CPT

## 2025-04-23 PROCEDURE — 87077 CULTURE AEROBIC IDENTIFY: CPT

## 2025-04-23 PROCEDURE — 87086 URINE CULTURE/COLONY COUNT: CPT

## 2025-04-23 PROCEDURE — 85025 COMPLETE CBC W/AUTO DIFF WBC: CPT

## 2025-04-24 VITALS — TEMPERATURE: 98.7 F | DIASTOLIC BLOOD PRESSURE: 91 MMHG | SYSTOLIC BLOOD PRESSURE: 145 MMHG | OXYGEN SATURATION: 97 %

## 2025-04-24 RX ADMIN — KETOROLAC TROMETHAMINE ONE MG: 30 INJECTION, SOLUTION INTRAMUSCULAR at 00:02
